# Patient Record
Sex: FEMALE | Race: WHITE
[De-identification: names, ages, dates, MRNs, and addresses within clinical notes are randomized per-mention and may not be internally consistent; named-entity substitution may affect disease eponyms.]

---

## 2018-07-25 ENCOUNTER — HOSPITAL ENCOUNTER (EMERGENCY)
Dept: HOSPITAL 42 - ED | Age: 43
Discharge: HOME | End: 2018-07-25
Payer: COMMERCIAL

## 2018-07-25 VITALS — HEART RATE: 65 BPM | DIASTOLIC BLOOD PRESSURE: 70 MMHG | SYSTOLIC BLOOD PRESSURE: 118 MMHG | TEMPERATURE: 97.9 F

## 2018-07-25 VITALS — OXYGEN SATURATION: 100 % | RESPIRATION RATE: 18 BRPM

## 2018-07-25 VITALS — BODY MASS INDEX: 30.7 KG/M2

## 2018-07-25 DIAGNOSIS — R10.11: Primary | ICD-10-CM

## 2018-07-25 LAB
ALBUMIN SERPL-MCNC: 4.2 G/DL (ref 3–4.8)
ALBUMIN/GLOB SERPL: 1.2 {RATIO} (ref 1.1–1.8)
ALT SERPL-CCNC: 32 U/L (ref 7–56)
APPEARANCE UR: CLEAR
APTT BLD: 28.6 SECONDS (ref 25.1–36.5)
AST SERPL-CCNC: 37 U/L (ref 14–36)
BACTERIA #/AREA URNS HPF: (no result) /[HPF]
BASOPHILS # BLD AUTO: 0.05 K/MM3 (ref 0–2)
BASOPHILS NFR BLD: 0.9 % (ref 0–3)
BILIRUB UR-MCNC: NEGATIVE MG/DL
BUN SERPL-MCNC: 15 MG/DL (ref 7–21)
CALCIUM SERPL-MCNC: 9 MG/DL (ref 8.4–10.5)
COLOR UR: YELLOW
EOSINOPHIL # BLD: 0.1 10*3/UL (ref 0–0.7)
EOSINOPHIL NFR BLD: 0.9 % (ref 1.5–5)
ERYTHROCYTE [DISTWIDTH] IN BLOOD BY AUTOMATED COUNT: 14.1 % (ref 11.5–14.5)
GFR NON-AFRICAN AMERICAN: > 60
GLUCOSE UR STRIP-MCNC: NEGATIVE MG/DL
GRANULOCYTES # BLD: 3.56 10*3/UL (ref 1.4–6.5)
GRANULOCYTES NFR BLD: 62.8 % (ref 50–68)
HGB BLD-MCNC: 11.1 G/DL (ref 12–16)
INR PPP: 1.03 (ref 0.93–1.08)
LEUKOCYTE ESTERASE UR-ACNC: (no result) LEU/UL
LIPASE SERPL-CCNC: 537 U/L (ref 23–300)
LYMPHOCYTES # BLD: 1.8 10*3/UL (ref 1.2–3.4)
LYMPHOCYTES NFR BLD AUTO: 32.2 % (ref 22–35)
MCH RBC QN AUTO: 25.9 PG (ref 25–35)
MCHC RBC AUTO-ENTMCNC: 33.2 G/DL (ref 31–37)
MCV RBC AUTO: 78 FL (ref 80–105)
MONOCYTES # BLD AUTO: 0.2 10*3/UL (ref 0.1–0.6)
MONOCYTES NFR BLD: 3.2 % (ref 1–6)
PH UR STRIP: 6 [PH] (ref 4.7–8)
PLATELET # BLD: 177 10^3/UL (ref 120–450)
PMV BLD AUTO: 11.5 FL (ref 7–11)
PROT UR STRIP-MCNC: NEGATIVE MG/DL
PROTHROMBIN TIME: 11.8 SECONDS (ref 9.4–12.5)
RBC # BLD AUTO: 4.28 10^6/UL (ref 3.5–6.1)
RBC # UR STRIP: NEGATIVE /UL
RBC #/AREA URNS HPF: NEGATIVE /HPF (ref 0–2)
SP GR UR STRIP: 1.02 (ref 1–1.03)
UROBILINOGEN UR STRIP-ACNC: 0.2 E.U./DL
WBC # BLD AUTO: 5.7 10^3/UL (ref 4.5–11)
WBC #/AREA URNS HPF: (no result) /HPF (ref 0–6)

## 2018-07-25 PROCEDURE — 83690 ASSAY OF LIPASE: CPT

## 2018-07-25 PROCEDURE — 80053 COMPREHEN METABOLIC PANEL: CPT

## 2018-07-25 PROCEDURE — 87086 URINE CULTURE/COLONY COUNT: CPT

## 2018-07-25 PROCEDURE — 96361 HYDRATE IV INFUSION ADD-ON: CPT

## 2018-07-25 PROCEDURE — 81001 URINALYSIS AUTO W/SCOPE: CPT

## 2018-07-25 PROCEDURE — 85610 PROTHROMBIN TIME: CPT

## 2018-07-25 PROCEDURE — 85730 THROMBOPLASTIN TIME PARTIAL: CPT

## 2018-07-25 PROCEDURE — 83735 ASSAY OF MAGNESIUM: CPT

## 2018-07-25 PROCEDURE — 85025 COMPLETE CBC W/AUTO DIFF WBC: CPT

## 2018-07-25 PROCEDURE — 99284 EMERGENCY DEPT VISIT MOD MDM: CPT

## 2018-07-25 PROCEDURE — 76705 ECHO EXAM OF ABDOMEN: CPT

## 2018-07-25 PROCEDURE — 96374 THER/PROPH/DIAG INJ IV PUSH: CPT

## 2018-07-25 PROCEDURE — 96375 TX/PRO/DX INJ NEW DRUG ADDON: CPT

## 2018-07-25 NOTE — ED PDOC
Arrival/HPI





- General


Chief Complaint: Abdominal Pain


Time Seen by Provider: 18 15:12


Historian: Patient





- History of Present Illness


Narrative History of Present Illness (Text): 





18 15:31


43yo obese female with no pmhx who present with complaint of sharp RUQ pain x 

3days. States the pain resolved yesterday and started again today after eating. 

states pain is worse today. She admits o nausea. Denies vomiting, diarrhea, 

constipation, melena, hematmesis, urinary symptoms, back pain, chest pain, any 

other complant.





Past Medical History





- Provider Review


Nursing Documentation Reviewed: Yes





- Infectious Disease


Hx of Infectious Diseases: None





- Tetanus Immunization


Tetanus Immunization: Unknown





- Past Medical History


Past Medical History: No Previous





- Cardiac


Hx Cardiac Disorders: No





- Pulmonary


Hx Respiratory Disorders: No





- Neurological


Hx Neurological Disorder: No





- HEENT


Hx HEENT Disorder: No





- Renal


Hx Renal Disorder: No





- Endocrine/Metabolic


Hx Endocrine Disorders: No





- Hematological/Oncological


Hx Blood Disorders: No





- Integumentary


Hx Dermatological Disorder: No





- Musculoskeletal/Rheumatological


Hx Musculoskeletal Disorders: No





- Gastrointestinal


Hx Gastrointestinal Disorders: Yes


Hx Gastroesophageal Reflux: Yes





- Genitourinary/Gynecological


Hx Genitourinary Disorders: No





- Psychiatric


Hx Psychophysiologic Disorder: Yes


Hx Depression: Yes


Hx Substance Use: No





- Surgical History


Hx  Section: Yes





- Anesthesia


Hx Anesthesia Reactions: No


Hx Malignant Hyperthermia: No





- Suicidal Assessment


Feels Threatened In Home Enviroment: No





Family/Social History





- Physician Review


Nursing Documentation Reviewed: Yes


Family/Social History: Unknown Family HX


Smoking Status: Never Smoked


Hx Alcohol Use: No


Hx Substance Use: No


Hx Substance Use Treatment: No





Allergies/Home Meds


Allergies/Adverse Reactions: 


Allergies





No Known Allergies Allergy (Verified 18 15:03)


 








Home Medications: 


 Home Meds











 Medication  Instructions  Recorded  Confirmed


 


Omeprazole [Omeprazole] 40 mg PO DAILY 18














Review of Systems





- Physician Review


All systems were reviewed & negative as marked: Yes





- Review of Systems


Constitutional: Normal


Eyes: Normal


ENT: Normal


Respiratory: Normal


Cardiovascular: Normal


Gastrointestinal: Abdominal Pain, Nausea.  absent: Constipation, Diarrhea, 

Vomiting, Hematochezia, Hematemesis


Genitourinary Female: Normal


Musculoskeletal: Normal


Skin: Normal


Neurological: Normal


Endocrine: Normal


Hemo/Lymphatic: Normal


Psychiatric: Normal





Physical Exam


Vital Signs Reviewed: Yes


Vital Signs











  Temp Pulse Resp BP Pulse Ox


 


 18 18:22  97.9 F  65  18  118/70  100


 


 18 17:05  97.8 F  62  18  117/72  100


 


 18 15:04  98.1 F  72  16  116/77  99











Temperature: Afebrile


Blood Pressure: Normal


Pulse: Regular


Respiratory Rate: Normal


Appearance: Positive for: Well-Appearing, Non-Toxic, Comfortable


Pain Distress: None


Mental Status: Positive for: Alert and Oriented X 3





- Systems Exam


Head: Present: Atraumatic, Normocephalic


Pupils: Present: PERRL


Extroacular Muscles: Present: EOMI


Conjunctiva: Present: Normal


Mouth: Present: Moist Mucous Membranes


Neck: Present: Normal Range of Motion


Respiratory/Chest: Present: Clear to Auscultation, Good Air Exchange.  No: 

Respiratory Distress, Accessory Muscle Use


Cardiovascular: Present: Regular Rate and Rhythm, Normal S1, S2.  No: Murmurs


Abdomen: Present: Tenderness (RUQ), Normal Bowel Sounds, Other (Soft).  No: 

Distention, Peritoneal Signs, Rebound, Guarding, McBurney's Point Tender, 

Rovsing's Sign Present


Back: Present: Normal Inspection


Upper Extremity: Present: Normal Inspection.  No: Cyanosis, Edema


Lower Extremity: Present: Normal Inspection.  No: Edema


Neurological: Present: GCS=15, CN II-XII Intact, Speech Normal


Skin: Present: Warm, Dry, Normal Color.  No: Rashes


Psychiatric: Present: Alert, Oriented x 3, Normal Insight, Normal Concentration





Medical Decision Making


ED Course and Treatment: 





18 01:02


Pt presented for stated history.





Lab was unremarkable with exception of mild elevation of Lipase. PT was 

hydrated and her pain controlled in ED with medication. On re evaluation she 

states that her pain resolved, smiling. All result was DW the pt. she was given 

a copy of the US and Lab for her PMD. Rx of Tramadol and pepcid  was given for 

pain. She was advised TRT ED for any new or worsening symptoms.











- Lab Interpretations


Lab Results: 








 18 16:20 





 18 16:20 





 Lab Results





18 16:20: Sodium 142, Potassium 3.8, Chloride 104, Carbon Dioxide 26, 

Anion Gap 16, BUN 15, Creatinine 0.5 L, Est GFR ( Amer) > 60, Est GFR (

Non-Af Amer) > 60, Random Glucose 84, Calcium 9.0, Magnesium 1.9, Total 

Bilirubin 0.6, AST 37 H, ALT 32, Alkaline Phosphatase 82, Total Protein 7.8, 

Albumin 4.2, Globulin 3.6, Albumin/Globulin Ratio 1.2, Lipase 537 H


18 16:20: Urine Color Yellow, Urine Appearance Clear, Urine pH 6.0, Ur 

Specific Gravity 1.025, Urine Protein Negative, Urine Glucose (UA) Negative, 

Urine Ketones Negative, Urine Blood Negative, Urine Nitrate Negative, Urine 

Bilirubin Negative, Urine Urobilinogen 0.2, Ur Leukocyte Esterase Trace H, 

Urine RBC Negative, Urine WBC 0 - 2, Ur Epithelial Cells 1 - 3, Urine Bacteria 

Few


18 16:20: PT 11.8, INR 1.03, APTT 28.6


18 16:20: WBC 5.7, RBC 4.28, Hgb 11.1 L, Hct 33.4 L, MCV 78.0 L, MCH 25.9

, MCHC 33.2, RDW 14.1, Plt Count 177, MPV 11.5 H, Gran % 62.8, Lymph % (Auto) 

32.2, Mono % (Auto) 3.2, Eos % (Auto) 0.9 L, Baso % (Auto) 0.9, Gran # 3.56, 

Lymph # (Auto) 1.8, Mono # (Auto) 0.2, Eos # (Auto) 0.1, Baso # (Auto) 0.05











- RAD Interpretation


Radiology Orders: 








18 15:30


GALLBLADDER & PANCREAS [US] Stat 














- Medication Orders


Current Medication Orders: 











Discontinued Medications





Famotidine (Pepcid)  20 mg IVP STAT STA


   Stop: 18 15:30


   Last Admin: 18 16:21  Dose: 20 mg





IVP Administration


 Document     18 16:21  LA  (Rec: 18 16:22  LA  [id number])


     Charges for Administration


      # of IVP Administrations                   1





Sodium Chloride (Sodium Chloride 0.9%)  1,000 mls @ 1,000 mls/hr IV .Q1H STA


   Stop: 07/25/18 16:28


   Last Admin: 18 16:22  Dose: 1,000 mls/hr





eMAR Start Stop


 Document     18 16:22  LA  (Rec: 18 16:22  LA  [id number])


     Intravenous Solution


      Start Date                                 18


      Start Time                                 16:22


      End Date                                   18


      End time                                   17:22


      Total Infusion Time                        60





Ketorolac Tromethamine (Toradol)  30 mg IVP STAT STA


   Stop: 18 16:59


   Last Admin: 18 17:13  Dose: 30 mg





MAR Pain Assessment


 Document     18 17:13  LA  (Rec: 18 17:14  LA  [id number])


     Pain Reassessment


      Is this a pain reassessment?               No


     Sleep


      Is patient sleeping during reassessment?   No


     Presence of Pain


      Presence of Pain                           Yes


     Pain Scale Used


      Pain Scale Used                            Numeric


     Location


      Left, Right or Bilateral                   Right


      Upper or Lower                             Upper


      Pain Location Body Site                    Abdomen


     Description


      Description                                Cramping


      Intensity of Pain at present               5


      Pain Behavior                              Guarding


IVP Administration


 Document     18 17:13  LA  (Rec: 18 17:14  LA  [id number])


     Charges for Administration


      # of IVP Administrations                   1





Ondansetron HCl (Zofran Inj)  4 mg IVP STAT STA


   Stop: 18 15:30


   Last Admin: 18 16:21  Dose: 4 mg





IVP Administration


 Document     18 16:21  LA  (Rec: 18 16:21  LA  [id number])


     Charges for Administration


      # of IVP Administrations                   1














Disposition/Present on Arrival





- Present on Arrival


Any Indicators Present on Arrival: No


History of DVT/PE: No


History of Uncontrolled Diabetes: No


Urinary Catheter: No


History of Decub. Ulcer: No


History Surgical Site Infection Following: None





- Disposition


Have Diagnosis and Disposition been Completed?: Yes


Diagnosis: 


 Abdominal pain





Disposition: HOME/ ROUTINE


Disposition Time: 17:55


Patient Plan: Discharge


Condition: STABLE


Discharge Instructions (ExitCare):  Acute Abdomen (Belly Pain), Adult (DC)


Additional Instructions: 


Follow up with your Doctor/Surgeon


Return to ED for any new or worsening symptoms


Prescriptions: 


Famotidine [Pepcid] 40 mg PO DAILY #10 tab


traMADol [Ultram] 50 mg PO TID #7 tab


Referrals: 


Tai Crespo MD [Primary Care Provider] - Follow up with primary


Rusty Franco MD [Staff Provider] - Follow up with primary


Forms:  [employer] (English), WORK NOTE

## 2018-07-25 NOTE — US
Date of service: 



07/25/2018



HISTORY:

RUQ pain



COMPARISON:

None.



TECHNIQUE:

Sonographic evaluation of the right upper quadrant of the abdomen.



FINDINGS:



LIVER:

Enlarged, measuring 17.8 cm in length. Increased echogenicity of the 

liver parenchyma.  No mass. No intrahepatic bile duct dilatation. 



GALLBLADDER:

8 mm gallbladder polyp versus non shadowing gallstone versus 

tumefactive sludge. Sonographic Pena's sign was not elicited. 



COMMON BILE DUCT:

Measures 4 mm.  No stones. No dilatation.



PANCREAS:

Unremarkable as visualized. No mass. No ductal dilatation.



RIGHT KIDNEY:

Measures 9.8 x 4.8 x 4.2 cm in length. Normal echogenicity. No 

calculus, mass, or hydronephrosis.



AORTA:

No aneurysmal dilatation.



IVC:

Unremarkable.



OTHER FINDINGS:

None .



IMPRESSION:

Hepatomegaly with steatosis.



8 mm gallbladder polyp versus non shadowing gallstone versus 

tumefactive sludge.  No sonographic evidence of acute cholecystitis.

## 2018-09-25 ENCOUNTER — HOSPITAL ENCOUNTER (OUTPATIENT)
Dept: HOSPITAL 31 - C.ENDO | Age: 43
Discharge: HOME | End: 2018-09-25
Attending: SPECIALIST
Payer: COMMERCIAL

## 2018-09-25 VITALS — HEART RATE: 59 BPM | RESPIRATION RATE: 12 BRPM | SYSTOLIC BLOOD PRESSURE: 107 MMHG | DIASTOLIC BLOOD PRESSURE: 74 MMHG

## 2018-09-25 VITALS — BODY MASS INDEX: 31.4 KG/M2

## 2018-09-25 VITALS — TEMPERATURE: 97.8 F

## 2018-09-25 VITALS — OXYGEN SATURATION: 100 %

## 2018-09-25 DIAGNOSIS — K29.80: ICD-10-CM

## 2018-09-25 DIAGNOSIS — K44.9: ICD-10-CM

## 2018-09-25 DIAGNOSIS — K29.70: Primary | ICD-10-CM

## 2018-09-25 PROCEDURE — 84703 CHORIONIC GONADOTROPIN ASSAY: CPT

## 2018-09-25 PROCEDURE — 88305 TISSUE EXAM BY PATHOLOGIST: CPT

## 2018-09-25 PROCEDURE — 88342 IMHCHEM/IMCYTCHM 1ST ANTB: CPT

## 2018-09-25 PROCEDURE — 43239 EGD BIOPSY SINGLE/MULTIPLE: CPT

## 2018-09-25 NOTE — CP.SDSHP
[facility]





- History


Proposed Procedure: EGD


Pre-Op Diagnosis: SEE NOTES





- Previous Medical/Surgical History


Misc: Other


Pain: 4.Moderate Pain





- Allergies


Allergies: 


Allergies





No Known Allergies Allergy (Verified 09/24/18 11:03)


   











- Physical Exam


General Appearance: N


Vital Signs: 


                                   Vital Signs











  09/25/18





  06:55


 


Temperature 97.2 F L


 


Pulse Rate 72


 


Respiratory 19





Rate 


 


Blood Pressure 140/72


 


O2 Sat by Pulse 100





Oximetry 











Mental Status: Alert & Oriented x3


Neuro: WNL


Heart: WNL


Lungs: WNL


GI: Other





- {Optional Preform as Required}


Breast: WNL


Abdomen: Other


Rectal: Other


Integument: WNL


: WNL


Ortho: Other


ENT: WNL





- Impression


Pt. Evaluated Today:Candidate for Anesthesia & Procedure: Yes





- Date & Time


Time: 08:21





Short Stay Discharge





- Short Stay Discharge


Admitting Diagnosis/Reason for Visit: DYSPEPSIA


Disposition: HOME/ ROUTINE

## 2018-11-06 ENCOUNTER — HOSPITAL ENCOUNTER (OUTPATIENT)
Dept: HOSPITAL 31 - C.ENDO | Age: 43
Discharge: HOME | End: 2018-11-06
Attending: SPECIALIST
Payer: COMMERCIAL

## 2018-11-06 VITALS — SYSTOLIC BLOOD PRESSURE: 103 MMHG | HEART RATE: 70 BPM | OXYGEN SATURATION: 100 % | DIASTOLIC BLOOD PRESSURE: 60 MMHG

## 2018-11-06 VITALS — TEMPERATURE: 97.8 F

## 2018-11-06 VITALS — RESPIRATION RATE: 13 BRPM

## 2018-11-06 VITALS — BODY MASS INDEX: 31.4 KG/M2

## 2018-11-06 DIAGNOSIS — K64.8: ICD-10-CM

## 2018-11-06 DIAGNOSIS — K62.5: Primary | ICD-10-CM

## 2018-11-06 PROCEDURE — 45378 DIAGNOSTIC COLONOSCOPY: CPT

## 2018-11-06 PROCEDURE — 84703 CHORIONIC GONADOTROPIN ASSAY: CPT

## 2018-11-06 PROCEDURE — 88305 TISSUE EXAM BY PATHOLOGIST: CPT

## 2018-11-06 NOTE — CP.SDSHP
[facility]





- History


Proposed Procedure: COLONSCOPY


Pre-Op Diagnosis: SEE NOTES





- Previous Medical/Surgical History


Misc: Other


Pain: 4.Moderate Pain





- Allergies


Allergies: 


Allergies





No Known Allergies Allergy (Verified 11/06/18 07:15)


   











- Physical Exam


General Appearance: N


Vital Signs: 


                                   Vital Signs











  11/06/18





  07:15


 


Temperature 97.5 F L


 


Pulse Rate 79


 


Respiratory 19





Rate 


 


Blood Pressure 127/64


 


O2 Sat by Pulse 100





Oximetry 











Mental Status: Alert & Oriented x3


Neuro: WNL


Heart: WNL


Lungs: WNL


GI: Other





- {Optional Preform as Required}


Breast: WNL


Abdomen: Other


Rectal: Other


Integument: WNL


: WNL


Ortho: WNL


ENT: WNL





- Impression


Pt. Evaluated Today:Candidate for Anesthesia & Procedure: Yes





- Date & Time


Time: 08:29





Short Stay Discharge





- Short Stay Discharge


Admitting Diagnosis/Reason for Visit: RECTAL BLEEDING


Disposition: HOME/ ROUTINE